# Patient Record
Sex: MALE | Race: BLACK OR AFRICAN AMERICAN | NOT HISPANIC OR LATINO | Employment: UNEMPLOYED | URBAN - METROPOLITAN AREA
[De-identification: names, ages, dates, MRNs, and addresses within clinical notes are randomized per-mention and may not be internally consistent; named-entity substitution may affect disease eponyms.]

---

## 2018-01-17 ENCOUNTER — OFFICE VISIT (OUTPATIENT)
Dept: OCCUPATIONAL MEDICINE | Age: 28
End: 2018-01-17

## 2018-01-17 ENCOUNTER — HOSPITAL ENCOUNTER (OUTPATIENT)
Dept: GENERAL RADIOLOGY | Age: 28
Discharge: HOME | End: 2018-01-17
Attending: PREVENTIVE MEDICINE

## 2018-01-17 DIAGNOSIS — Z02.1 PRE-EMPLOYMENT HEALTH SCREENING EXAMINATION: Primary | ICD-10-CM

## 2018-01-17 DIAGNOSIS — Z02.89 VISIT FOR OCCUPATIONAL HEALTH EXAMINATION: Primary | ICD-10-CM

## 2018-01-17 DIAGNOSIS — Z02.89 VISIT FOR OCCUPATIONAL HEALTH EXAMINATION: ICD-10-CM

## 2018-01-17 DIAGNOSIS — Z11.1 SCREENING FOR TUBERCULOSIS: ICD-10-CM

## 2018-01-17 DIAGNOSIS — Z02.1 PRE-EMPLOYMENT HEALTH SCREENING EXAMINATION: ICD-10-CM

## 2018-01-17 PROCEDURE — 71045 X-RAY EXAM CHEST 1 VIEW: CPT

## 2018-01-17 PROCEDURE — 71045 X-RAY EXAM CHEST 1 VIEW: CPT | Performed by: RADIOLOGY

## 2018-01-17 PROCEDURE — 86480 TB TEST CELL IMMUN MEASURE: CPT | Performed by: INTERNAL MEDICINE

## 2018-01-18 ENCOUNTER — TELEPHONE (OUTPATIENT)
Dept: OCCUPATIONAL MEDICINE | Age: 28
End: 2018-01-18

## 2018-01-18 LAB
ANNOTATION COMMENT IMP: NEGATIVE
M TB IFN-G CD4+ BCKGRND COR BLD-ACNC: 0 IUNITS/ML
M TB IFN-G CD4+ T-CELLS BLD-ACNC: 0.03 IUNITS/ML
M TB TUBERC IGNF/MITOGEN IGNF CONTROL: >10 IUNITS/ML

## 2018-05-21 ENCOUNTER — LAB SERVICES (OUTPATIENT)
Dept: LAB | Age: 28
End: 2018-05-21

## 2018-05-21 ENCOUNTER — OFFICE VISIT (OUTPATIENT)
Dept: FAMILY MEDICINE | Age: 28
End: 2018-05-21

## 2018-05-21 VITALS
HEART RATE: 76 BPM | WEIGHT: 153 LBS | SYSTOLIC BLOOD PRESSURE: 124 MMHG | BODY MASS INDEX: 24.01 KG/M2 | DIASTOLIC BLOOD PRESSURE: 80 MMHG | HEIGHT: 67 IN

## 2018-05-21 DIAGNOSIS — Z23 NEED FOR VACCINATION: ICD-10-CM

## 2018-05-21 DIAGNOSIS — Z00.00 ANNUAL PHYSICAL EXAM: Primary | ICD-10-CM

## 2018-05-21 DIAGNOSIS — Z00.00 ANNUAL PHYSICAL EXAM: ICD-10-CM

## 2018-05-21 LAB
ANION GAP SERPL CALC-SCNC: 12 MMOL/L (ref 10–20)
BUN SERPL-MCNC: 10 MG/DL (ref 6–20)
BUN/CREAT SERPL: 9 (ref 7–25)
CALCIUM SERPL-MCNC: 8.9 MG/DL (ref 8.4–10.2)
CHLORIDE SERPL-SCNC: 102 MMOL/L (ref 98–107)
CHOLEST SERPL-MCNC: 216 MG/DL
CHOLEST/HDLC SERPL: 4.5 {RATIO}
CO2 SERPL-SCNC: 30 MMOL/L (ref 21–32)
CREAT SERPL-MCNC: 1.06 MG/DL (ref 0.67–1.17)
FASTING STATUS PATIENT QL REPORTED: 3 HRS
GLUCOSE SERPL-MCNC: 92 MG/DL (ref 65–99)
HDLC SERPL-MCNC: 48 MG/DL
LDLC SERPL-MCNC: 152 MG/DL
LENGTH OF FAST TIME PATIENT: 3 HRS
NONHDLC SERPL-MCNC: 168 MG/DL
POTASSIUM SERPL-SCNC: 4.1 MMOL/L (ref 3.4–5.1)
SODIUM SERPL-SCNC: 140 MMOL/L (ref 135–145)
TRIGL SERPL-MCNC: 82 MG/DL

## 2018-05-21 PROCEDURE — 80061 LIPID PANEL: CPT | Performed by: INTERNAL MEDICINE

## 2018-05-21 PROCEDURE — 80048 BASIC METABOLIC PNL TOTAL CA: CPT | Performed by: INTERNAL MEDICINE

## 2018-05-21 PROCEDURE — 36415 COLL VENOUS BLD VENIPUNCTURE: CPT | Performed by: INTERNAL MEDICINE

## 2018-05-21 PROCEDURE — 99385 PREV VISIT NEW AGE 18-39: CPT | Performed by: FAMILY MEDICINE

## 2018-05-21 PROCEDURE — 90715 TDAP VACCINE 7 YRS/> IM: CPT | Performed by: FAMILY MEDICINE

## 2018-05-21 PROCEDURE — 90471 IMMUNIZATION ADMIN: CPT | Performed by: FAMILY MEDICINE

## 2018-05-21 ASSESSMENT — ENCOUNTER SYMPTOMS
DIARRHEA: 0
VOMITING: 0
NAUSEA: 0
SHORTNESS OF BREATH: 0
COUGH: 0
CHILLS: 0
WHEEZING: 0
DIAPHORESIS: 0
FEVER: 0

## 2018-05-21 ASSESSMENT — PATIENT HEALTH QUESTIONNAIRE - PHQ9
SUM OF ALL RESPONSES TO PHQ9 QUESTIONS 1 AND 2: 0
CLINICAL INTERPRETATION OF PHQ2 SCORE: 0

## 2020-03-10 ENCOUNTER — HEALTH MAINTENANCE LETTER (OUTPATIENT)
Age: 30
End: 2020-03-10

## 2020-12-20 ENCOUNTER — HEALTH MAINTENANCE LETTER (OUTPATIENT)
Age: 30
End: 2020-12-20

## 2021-04-24 ENCOUNTER — HEALTH MAINTENANCE LETTER (OUTPATIENT)
Age: 31
End: 2021-04-24

## 2021-10-03 ENCOUNTER — HEALTH MAINTENANCE LETTER (OUTPATIENT)
Age: 31
End: 2021-10-03

## 2022-05-15 ENCOUNTER — HEALTH MAINTENANCE LETTER (OUTPATIENT)
Age: 32
End: 2022-05-15

## 2022-09-11 ENCOUNTER — HEALTH MAINTENANCE LETTER (OUTPATIENT)
Age: 32
End: 2022-09-11

## 2023-06-03 ENCOUNTER — HEALTH MAINTENANCE LETTER (OUTPATIENT)
Age: 33
End: 2023-06-03

## 2023-08-09 ENCOUNTER — OFFICE VISIT (OUTPATIENT)
Dept: FAMILY MEDICINE | Facility: CLINIC | Age: 33
End: 2023-08-09
Payer: COMMERCIAL

## 2023-08-09 VITALS
OXYGEN SATURATION: 99 % | HEART RATE: 72 BPM | DIASTOLIC BLOOD PRESSURE: 77 MMHG | WEIGHT: 175 LBS | HEIGHT: 67 IN | TEMPERATURE: 97.9 F | RESPIRATION RATE: 14 BRPM | BODY MASS INDEX: 27.47 KG/M2 | SYSTOLIC BLOOD PRESSURE: 115 MMHG

## 2023-08-09 DIAGNOSIS — Z00.00 ROUTINE GENERAL MEDICAL EXAMINATION AT A HEALTH CARE FACILITY: Primary | ICD-10-CM

## 2023-08-09 DIAGNOSIS — Z13.220 SCREENING FOR HYPERLIPIDEMIA: ICD-10-CM

## 2023-08-09 DIAGNOSIS — R39.15 URINARY URGENCY: ICD-10-CM

## 2023-08-09 DIAGNOSIS — R30.0 DYSURIA: ICD-10-CM

## 2023-08-09 DIAGNOSIS — Z11.4 SCREENING FOR HIV (HUMAN IMMUNODEFICIENCY VIRUS): ICD-10-CM

## 2023-08-09 DIAGNOSIS — Z11.59 NEED FOR HEPATITIS C SCREENING TEST: ICD-10-CM

## 2023-08-09 DIAGNOSIS — Z11.3 SCREEN FOR STD (SEXUALLY TRANSMITTED DISEASE): ICD-10-CM

## 2023-08-09 LAB
ALBUMIN SERPL BCG-MCNC: 4.8 G/DL (ref 3.5–5.2)
ALBUMIN UR-MCNC: NEGATIVE MG/DL
ALP SERPL-CCNC: 82 U/L (ref 40–129)
ALT SERPL W P-5'-P-CCNC: 49 U/L (ref 0–70)
ANION GAP SERPL CALCULATED.3IONS-SCNC: 11 MMOL/L (ref 7–15)
APPEARANCE UR: CLEAR
AST SERPL W P-5'-P-CCNC: 41 U/L (ref 0–45)
BILIRUB SERPL-MCNC: 0.8 MG/DL
BILIRUB UR QL STRIP: NEGATIVE
BUN SERPL-MCNC: 8.8 MG/DL (ref 6–20)
CALCIUM SERPL-MCNC: 9.8 MG/DL (ref 8.6–10)
CHLORIDE SERPL-SCNC: 100 MMOL/L (ref 98–107)
CHOLEST SERPL-MCNC: 192 MG/DL
COLOR UR AUTO: YELLOW
CREAT SERPL-MCNC: 1.1 MG/DL (ref 0.67–1.17)
DEPRECATED HCO3 PLAS-SCNC: 27 MMOL/L (ref 22–29)
GFR SERPL CREATININE-BSD FRML MDRD: >90 ML/MIN/1.73M2
GLUCOSE SERPL-MCNC: 84 MG/DL (ref 70–99)
GLUCOSE UR STRIP-MCNC: NEGATIVE MG/DL
HDLC SERPL-MCNC: 54 MG/DL
HGB UR QL STRIP: NEGATIVE
KETONES UR STRIP-MCNC: NEGATIVE MG/DL
LDLC SERPL CALC-MCNC: 125 MG/DL
LEUKOCYTE ESTERASE UR QL STRIP: NEGATIVE
NITRATE UR QL: NEGATIVE
NONHDLC SERPL-MCNC: 138 MG/DL
PH UR STRIP: 7.5 [PH] (ref 5–7)
POTASSIUM SERPL-SCNC: 4.3 MMOL/L (ref 3.4–5.3)
PROT SERPL-MCNC: 7.7 G/DL (ref 6.4–8.3)
SODIUM SERPL-SCNC: 138 MMOL/L (ref 136–145)
SP GR UR STRIP: 1.01 (ref 1–1.03)
T PALLIDUM AB SER QL: NONREACTIVE
TRIGL SERPL-MCNC: 64 MG/DL
UROBILINOGEN UR STRIP-ACNC: 0.2 E.U./DL

## 2023-08-09 PROCEDURE — 90746 HEPB VACCINE 3 DOSE ADULT IM: CPT | Performed by: FAMILY MEDICINE

## 2023-08-09 PROCEDURE — 36415 COLL VENOUS BLD VENIPUNCTURE: CPT | Performed by: FAMILY MEDICINE

## 2023-08-09 PROCEDURE — 90471 IMMUNIZATION ADMIN: CPT | Performed by: FAMILY MEDICINE

## 2023-08-09 PROCEDURE — 86803 HEPATITIS C AB TEST: CPT | Performed by: FAMILY MEDICINE

## 2023-08-09 PROCEDURE — 99385 PREV VISIT NEW AGE 18-39: CPT | Mod: 25 | Performed by: FAMILY MEDICINE

## 2023-08-09 PROCEDURE — 80053 COMPREHEN METABOLIC PANEL: CPT | Performed by: FAMILY MEDICINE

## 2023-08-09 PROCEDURE — 81003 URINALYSIS AUTO W/O SCOPE: CPT | Performed by: FAMILY MEDICINE

## 2023-08-09 PROCEDURE — 87591 N.GONORRHOEAE DNA AMP PROB: CPT | Performed by: FAMILY MEDICINE

## 2023-08-09 PROCEDURE — 80061 LIPID PANEL: CPT | Performed by: FAMILY MEDICINE

## 2023-08-09 PROCEDURE — 99213 OFFICE O/P EST LOW 20 MIN: CPT | Mod: 25 | Performed by: FAMILY MEDICINE

## 2023-08-09 PROCEDURE — 86780 TREPONEMA PALLIDUM: CPT | Performed by: FAMILY MEDICINE

## 2023-08-09 PROCEDURE — 87389 HIV-1 AG W/HIV-1&-2 AB AG IA: CPT | Performed by: FAMILY MEDICINE

## 2023-08-09 PROCEDURE — 87491 CHLMYD TRACH DNA AMP PROBE: CPT | Performed by: FAMILY MEDICINE

## 2023-08-09 SDOH — ECONOMIC STABILITY: TRANSPORTATION INSECURITY
IN THE PAST 12 MONTHS, HAS THE LACK OF TRANSPORTATION KEPT YOU FROM MEDICAL APPOINTMENTS OR FROM GETTING MEDICATIONS?: NO

## 2023-08-09 SDOH — HEALTH STABILITY: PHYSICAL HEALTH: ON AVERAGE, HOW MANY MINUTES DO YOU ENGAGE IN EXERCISE AT THIS LEVEL?: 30 MIN

## 2023-08-09 SDOH — HEALTH STABILITY: PHYSICAL HEALTH: ON AVERAGE, HOW MANY DAYS PER WEEK DO YOU ENGAGE IN MODERATE TO STRENUOUS EXERCISE (LIKE A BRISK WALK)?: 1 DAY

## 2023-08-09 SDOH — ECONOMIC STABILITY: FOOD INSECURITY: WITHIN THE PAST 12 MONTHS, THE FOOD YOU BOUGHT JUST DIDN'T LAST AND YOU DIDN'T HAVE MONEY TO GET MORE.: NEVER TRUE

## 2023-08-09 SDOH — ECONOMIC STABILITY: TRANSPORTATION INSECURITY
IN THE PAST 12 MONTHS, HAS LACK OF TRANSPORTATION KEPT YOU FROM MEETINGS, WORK, OR FROM GETTING THINGS NEEDED FOR DAILY LIVING?: NO

## 2023-08-09 SDOH — ECONOMIC STABILITY: FOOD INSECURITY: WITHIN THE PAST 12 MONTHS, YOU WORRIED THAT YOUR FOOD WOULD RUN OUT BEFORE YOU GOT MONEY TO BUY MORE.: NEVER TRUE

## 2023-08-09 SDOH — ECONOMIC STABILITY: INCOME INSECURITY: HOW HARD IS IT FOR YOU TO PAY FOR THE VERY BASICS LIKE FOOD, HOUSING, MEDICAL CARE, AND HEATING?: NOT HARD AT ALL

## 2023-08-09 SDOH — ECONOMIC STABILITY: INCOME INSECURITY: IN THE LAST 12 MONTHS, WAS THERE A TIME WHEN YOU WERE NOT ABLE TO PAY THE MORTGAGE OR RENT ON TIME?: NO

## 2023-08-09 ASSESSMENT — ENCOUNTER SYMPTOMS
CHILLS: 0
DIZZINESS: 0
FEVER: 0
ARTHRALGIAS: 0
EYE PAIN: 0
NAUSEA: 0
SORE THROAT: 0
MYALGIAS: 0
ABDOMINAL PAIN: 0
HEMATURIA: 0
JOINT SWELLING: 0
WEAKNESS: 0
HEADACHES: 0
FREQUENCY: 0
HEMATOCHEZIA: 0
DIARRHEA: 0
HEARTBURN: 0
CONSTIPATION: 0
PALPITATIONS: 0
COUGH: 0
SHORTNESS OF BREATH: 0
PARESTHESIAS: 0
DYSURIA: 1
NERVOUS/ANXIOUS: 0

## 2023-08-09 ASSESSMENT — SOCIAL DETERMINANTS OF HEALTH (SDOH)
HOW OFTEN DO YOU ATTEND CHURCH OR RELIGIOUS SERVICES?: NEVER
IN A TYPICAL WEEK, HOW MANY TIMES DO YOU TALK ON THE PHONE WITH FAMILY, FRIENDS, OR NEIGHBORS?: MORE THAN THREE TIMES A WEEK
ARE YOU MARRIED, WIDOWED, DIVORCED, SEPARATED, NEVER MARRIED, OR LIVING WITH A PARTNER?: NEVER MARRIED
DO YOU BELONG TO ANY CLUBS OR ORGANIZATIONS SUCH AS CHURCH GROUPS UNIONS, FRATERNAL OR ATHLETIC GROUPS, OR SCHOOL GROUPS?: NO
HOW OFTEN DO YOU GET TOGETHER WITH FRIENDS OR RELATIVES?: ONCE A WEEK

## 2023-08-09 ASSESSMENT — LIFESTYLE VARIABLES
HOW MANY STANDARD DRINKS CONTAINING ALCOHOL DO YOU HAVE ON A TYPICAL DAY: PATIENT DOES NOT DRINK
AUDIT-C TOTAL SCORE: 0
HOW OFTEN DO YOU HAVE A DRINK CONTAINING ALCOHOL: NEVER
SKIP TO QUESTIONS 9-10: 1
HOW OFTEN DO YOU HAVE SIX OR MORE DRINKS ON ONE OCCASION: NEVER

## 2023-08-09 NOTE — PATIENT INSTRUCTIONS
I will review your studies via [employer] when they are available. If you have any questions or concerns please let me know via [employer], or call the clinic at  [phone]. See link below for Advance Care Planning, which is good to have on file in medical record. A copy can be scanned in to your medical record.     [email or web address]    Stay well hydrated to help prevent urinary urgency and discomfort.     Preventive Health Recommendations  Male Ages 26 - 39    Yearly exam:             See your health care provider every year in order to  o   Review health changes.   o   Discuss preventive care.    o   Review your medicines if your doctor has prescribed any.  You should be tested each year for STDs (sexually transmitted diseases), if you re at risk.   After age 35, talk to your provider about cholesterol testing. If you are at risk for heart disease, have your cholesterol tested at least every 5 years.   If you are at risk for diabetes, you should have a diabetes test (fasting glucose).  Shots: Get a flu shot each year. Get a tetanus shot every 10 years.     Nutrition:  Eat at least 5 servings of fruits and vegetables daily.   Eat whole-grain bread, whole-wheat pasta and brown rice instead of white grains and rice.   Get adequate Calcium and Vitamin D.     Lifestyle  Exercise for at least 150 minutes a week (30 minutes a day, 5 days a week). This will help you control your weight and prevent disease.   Limit alcohol to one drink per day.   No smoking.   Wear sunscreen to prevent skin cancer.   See your dentist every six months for an exam and cleaning.

## 2023-08-09 NOTE — PROGRESS NOTES
SUBJECTIVE:   CC: Virgil is an 33 year old who presents for preventative health visit.       Healthy Habits:     Getting at least 3 servings of Calcium per day:  NO    Bi-annual eye exam:  NO    Dental care twice a year:  NO    Sleep apnea or symptoms of sleep apnea:  Daytime drowsiness and Excessive snoring    Diet:  Regular (no restrictions)    Frequency of exercise:  None    Taking medications regularly:  Yes    Medication side effects:  Lightheadedness      Today's PHQ-2 Score:       8/9/2023     9:11 AM   PHQ-2 ( 1999 [employer])   Q1: Little interest or pleasure in doing things 0   Q2: Feeling down, depressed or hopeless 0   PHQ-2 Score 0   Q1: Little interest or pleasure in doing things Not at all   Q2: Feeling down, depressed or hopeless Not at all   PHQ-2 Score 0       STD testing requested.     Have you ever done Advance Care Planning? (For example, a Health Directive, POLST, or a discussion with a medical provider or your loved ones about your wishes): No, advance care planning information given to patient to review.  Patient plans to discuss their wishes with loved ones or provider.      Social History     Tobacco Use    Smoking status: Never    Smokeless tobacco: Never   Substance Use Topics    Alcohol use: No             8/9/2023     9:10 AM   Alcohol Use   Prescreen: >3 drinks/day or >7 drinks/week? No       Last PSA: No results found for: PSA    Reviewed orders with patient. Reviewed health maintenance and updated orders accordingly - Yes      Reviewed and updated as needed this visit by clinical staff   Tobacco  Allergies  Meds              Reviewed and updated as needed this visit by Provider     Meds                 Review of Systems   Constitutional:  Negative for chills and fever.   HENT:  Negative for congestion, ear pain, hearing loss and sore throat.    Eyes:  Negative for pain and visual disturbance.   Respiratory:  Negative for cough and shortness of breath.    Cardiovascular:  Negative  "for chest pain, palpitations and peripheral edema.   Gastrointestinal:  Negative for abdominal pain, constipation, diarrhea, heartburn, hematochezia and nausea.   Genitourinary:  Positive for dysuria and urgency. Negative for frequency, genital sores, hematuria, impotence and penile discharge.   Musculoskeletal:  Negative for arthralgias, joint swelling and myalgias.   Skin:  Negative for rash.   Neurological:  Negative for dizziness, weakness, headaches and paresthesias.   Psychiatric/Behavioral:  Negative for mood changes. The patient is not nervous/anxious.      Has had occasional dysuria and urinary urgency since about 2015, better from initially noting the symptoms. Tends to get yearly STD checking, never being positive. No symptoms today. He notes symptoms more in warm weather.  He would like STD testing done this visit.    Is working at [employer] as , and is part time student.         OBJECTIVE:   /77 (BP Location: Right arm, Patient Position: Chair, Cuff Size: Adult Large)   Pulse 72   Temp 97.9  F (36.6  C) (Oral)   Resp 14   Ht 1.702 m (5' 7\")   Wt 79.4 kg (175 lb)   SpO2 99%   BMI 27.41 kg/m      Physical Exam  General: Vital signs reviewed.  Patient is in no acute appearing distress.  Breathing appears nonlabored.  Patient is alert and oriented ×3.      ENT: Ear exam shows bilateral tympanic membranes to be clear without injection, nasal turbinates show no injection or edema, no pharyngeal injection or exudate.    Neck: supple with no adenoapthy, palpable abnormal masses, or thyroid abnormality.    Eyes: No scleral, lid, or periorbital injection or edema noted.  No eye mattering noted.  Corneas are clear. Pupils are equal round and reactive to light with normal consensual eye movement.    Heart: Heart rate is regular without murmur.    Lungs: Lungs are clear to auscultation with good airflow bilaterally.    Abdomen:  Abdomen is soft, nontender.  No palpable abnormal " masses or organomegaly.  Bowel sounds are normal.    Genital exam:   No urethral discharge noted.  No visible skin abnormalities.  No inguinal hernia palpated while standing during a cough.    Back: No areas of tenderness.    Skin: Warm and dry, with no rash or abnormal lesions noted.    Extremities: No lower leg edema noted.  No joint edema or restricted range of motion noted.    Neuro: No acute focal deficits or other abnormalities noted.    Psych: Patient is very pleasant, making good eye contact, with clear and fluent speech.  Answers questions appropriately.          ASSESSMENT/PLAN:   Virgil was seen today for physical.    Diagnoses and all orders for this visit:  See after visit summary and result note for helpful information and advice given to patient.    Routine general medical examination at a health care facility  -     REVIEW OF HEALTH MAINTENANCE PROTOCOL ORDERS  -     HEPATITIS B VACCINE ADULT 3 DOSE IM (ENGERIX-B/RECOMBIVAX HB)  -     Comprehensive metabolic panel    Screening for HIV (human immunodeficiency virus)  -     HIV Antigen Antibody Combo Cascade    Need for hepatitis C screening test  -     Hepatitis C Screen Reflex to HCV RNA Quant and Genotype    Dysuria    Urinary urgency  -     Treponema Abs w Reflex to RPR and Titer  -     UA Macroscopic with reflex to Microscopic and Culture - Lab Collect; Future  -     UA Macroscopic with reflex to Microscopic and Culture - Lab Collect    Screen for STD (sexually transmitted disease)  -     Hepatitis C Screen Reflex to HCV RNA Quant and Genotype  -     Chlamydia trachomatis/Neisseria gonorrhoeae by PCR  -     HIV Antigen Antibody Combo Cascade    Screening for hyperlipidemia  -     Lipid Profile              COUNSELING:   Reviewed preventive health counseling, as reflected in patient instructions        He reports that he has never smoked. He has never used smokeless tobacco.            John Juarez DO  [facility]

## 2023-08-10 LAB
C TRACH DNA SPEC QL PROBE+SIG AMP: NEGATIVE
HCV AB SERPL QL IA: NONREACTIVE
HIV 1+2 AB+HIV1 P24 AG SERPL QL IA: NONREACTIVE
N GONORRHOEA DNA SPEC QL NAA+PROBE: NEGATIVE

## 2024-09-15 ENCOUNTER — HEALTH MAINTENANCE LETTER (OUTPATIENT)
Age: 34
End: 2024-09-15

## 2024-09-24 ENCOUNTER — OFFICE VISIT (OUTPATIENT)
Dept: UROLOGY | Facility: CLINIC | Age: 34
End: 2024-09-24
Payer: COMMERCIAL

## 2024-09-24 VITALS — DIASTOLIC BLOOD PRESSURE: 85 MMHG | SYSTOLIC BLOOD PRESSURE: 123 MMHG | HEART RATE: 69 BPM

## 2024-09-24 DIAGNOSIS — R35.0 URINARY FREQUENCY: ICD-10-CM

## 2024-09-24 DIAGNOSIS — Z31.41 FERTILITY TESTING: Primary | ICD-10-CM

## 2024-09-24 DIAGNOSIS — N50.9 TESTICULAR ABNORMALITY: ICD-10-CM

## 2024-09-24 LAB
ALBUMIN UR-MCNC: NEGATIVE MG/DL
APPEARANCE UR: CLEAR
BILIRUB UR QL STRIP: NEGATIVE
COLOR UR AUTO: YELLOW
GLUCOSE UR STRIP-MCNC: NEGATIVE MG/DL
HGB UR QL STRIP: NEGATIVE
KETONES UR STRIP-MCNC: NEGATIVE MG/DL
LEUKOCYTE ESTERASE UR QL STRIP: NEGATIVE
NITRATE UR QL: NEGATIVE
PH UR STRIP: 6 [PH] (ref 5–7)
RESIDUAL VOLUME (RV) (EXTERNAL): 10
SP GR UR STRIP: 1.01 (ref 1–1.03)
UROBILINOGEN UR STRIP-ACNC: 0.2 E.U./DL

## 2024-09-24 PROCEDURE — 51798 US URINE CAPACITY MEASURE: CPT | Performed by: NURSE PRACTITIONER

## 2024-09-24 PROCEDURE — 81003 URINALYSIS AUTO W/O SCOPE: CPT | Mod: QW | Performed by: NURSE PRACTITIONER

## 2024-09-24 PROCEDURE — 99203 OFFICE O/P NEW LOW 30 MIN: CPT | Mod: 25 | Performed by: NURSE PRACTITIONER

## 2024-09-24 ASSESSMENT — PAIN SCALES - GENERAL: PAINLEVEL: NO PAIN (0)

## 2024-09-24 NOTE — LETTER
9/24/2024       RE: Virgil Perla  [id number]  [facility] [place] [facility] [id number]     Dear Colleague,    Thank you for referring your patient, Virgil Perla, to the [facility] [place] [facility]. Please see a copy of my visit note below.      Urology Outpatient Visit    Name: Virgil Perla    MRN: [id number]   YOB: 1990               Chief Complaint:   Testicular Problem         Impression and Plan:   Impression / Plan:   Virgil Perla is a 34 year old male with testicular complaint and fertility concerns.      -semen analysis  -Scrotal US    Thank you for the opportunity to participate in the care of Virgil Perla.     JEREMIAH Ellis, CNP   [facility] - Department of Urology  [phone]          History of Present Illness:   Virgil Perla is a 34 year old male with complaint of bilateral testicular firmness which began after his girlfriend sat on his lap in 2017.  His symptoms have been stable. Reports the pain will occasionally radiate into the R groin. He endorses some occasional burning with urination and urinary frequency.  Denies gross hematuria.  Low PVR here today. He reports he has had significant reduction in ejaculatory volume and desires fertility testing. [facility].    History is obtained from patient & EMR          Past Medical History:     Past Medical History:   Diagnosis Date     Acne           Past Surgical History:     Past Surgical History:   Procedure Laterality Date     NO HISTORY OF SURGERY            Social History:     Social History     Tobacco Use     Smoking status: Never     Smokeless tobacco: Never   Substance Use Topics     Alcohol use: No          Family History:     Family History   Problem Relation Age of Onset     No Known Problems Mother      No Known Problems Father      Prostate Cancer Maternal Grandfather 80 - 89     Family History Negative Other           Allergies:   No Known  "Allergies       Medications:     No current outpatient medications on file.     No current facility-administered medications for this visit.          Review of Systems:    ROS: See HPI for pertinent details.  Remainder of 10-point ROS negative.         Physical Exam:   VS:  T: Data Unavailable    HR: 69    BP: 123/85    RR: Data Unavailable     GEN:  Alert.  NAD.   HEENT:  Sclerae anicteric.    CV:  No obvious jugular venous distension.  LUNGS: No respiratory distress, breathing comfortably wo accessory muscle use.  ABD:  ND.     :  nontender, no masses or swelling of scrotum  SKIN:  Dry. No visible rashes.   NEURO:  CN grossly intact.          Laboratory Data:   No results found for: \"PSA\"  Lab Results   Component Value Date    CR 1.10 08/09/2023    CR 1.14 12/19/2015    CR 1.12 01/08/2015     Lab Results   Component Value Date    GFRESTIMATED >90 08/09/2023    GFRESTIMATED 78 12/19/2015    GFRESTIMATED 81 01/08/2015            Again, thank you for allowing me to participate in the care of your patient.      Sincerely,    JEREMIAH Alexis CNP    "

## 2024-09-24 NOTE — NURSING NOTE
Chief Complaint   Patient presents with    testicular swelling    Urinary Frequency    Dysuria     Pt states he has had bilateral testicular swelling since 2017, says it feels like a hardness, but not a separate lump.  Started after his girlfriend sat in his lap.  It has not been worsening.  Pt states he has some occasional burning with urination and frequency.  Denies gross hematuria    PVR: 10 mL by bladder scan      Ying Horan

## 2024-09-24 NOTE — PROGRESS NOTES
Urology Outpatient Visit    Name: Virgil Perla    MRN: [id number]   YOB: 1990               Chief Complaint:   Testicular Problem         Impression and Plan:   Impression / Plan:   Virgil Perla is a 34 year old male with testicular complaint and fertility concerns.      -semen analysis  -Scrotal US    Thank you for the opportunity to participate in the care of Virgil Perla.     Hortencia Nick APRN, CNP  [facility] - Department of Urology  [phone]          History of Present Illness:   Virgil Perla is a 34 year old male with complaint of bilateral testicular firmness which began after his girlfriend sat on his lap in 2017.  His symptoms have been stable. Reports the pain will occasionally radiate into the R groin. He endorses some occasional burning with urination and urinary frequency.  Denies gross hematuria.  Low PVR here today. He reports he has had significant reduction in ejaculatory volume and desires fertility testing. [facility].    History is obtained from patient & EMR          Past Medical History:     Past Medical History:   Diagnosis Date    Acne           Past Surgical History:     Past Surgical History:   Procedure Laterality Date    NO HISTORY OF SURGERY            Social History:     Social History     Tobacco Use    Smoking status: Never    Smokeless tobacco: Never   Substance Use Topics    Alcohol use: No          Family History:     Family History   Problem Relation Age of Onset    No Known Problems Mother     No Known Problems Father     Prostate Cancer Maternal Grandfather 80 - 89    Family History Negative Other           Allergies:   No Known Allergies       Medications:     No current outpatient medications on file.     No current facility-administered medications for this visit.          Review of Systems:    ROS: See HPI for pertinent details.  Remainder of 10-point ROS negative.         Physical Exam:   VS:  T: Data Unavailable    HR: 69    BP: 123/85     "RR: Data Unavailable     GEN:  Alert.  NAD.   HEENT:  Sclerae anicteric.    CV:  No obvious jugular venous distension.  LUNGS: No respiratory distress, breathing comfortably wo accessory muscle use.  ABD:  ND.     :  nontender, no masses or swelling of scrotum  SKIN:  Dry. No visible rashes.   NEURO:  CN grossly intact.          Laboratory Data:   No results found for: \"PSA\"  Lab Results   Component Value Date    CR 1.10 08/09/2023    CR 1.14 12/19/2015    CR 1.12 01/08/2015     Lab Results   Component Value Date    GFRESTIMATED >90 08/09/2023    GFRESTIMATED 78 12/19/2015    GFRESTIMATED 81 01/08/2015        "